# Patient Record
Sex: MALE | Race: WHITE | NOT HISPANIC OR LATINO | Employment: OTHER | URBAN - NONMETROPOLITAN AREA
[De-identification: names, ages, dates, MRNs, and addresses within clinical notes are randomized per-mention and may not be internally consistent; named-entity substitution may affect disease eponyms.]

---

## 2022-03-12 ENCOUNTER — HOSPITAL ENCOUNTER (EMERGENCY)
Facility: HOSPITAL | Age: 57
Discharge: HOME | End: 2022-03-12
Attending: STUDENT IN AN ORGANIZED HEALTH CARE EDUCATION/TRAINING PROGRAM
Payer: COMMERCIAL

## 2022-03-12 VITALS
TEMPERATURE: 98 F | WEIGHT: 175 LBS | RESPIRATION RATE: 28 BRPM | OXYGEN SATURATION: 98 % | DIASTOLIC BLOOD PRESSURE: 93 MMHG | SYSTOLIC BLOOD PRESSURE: 143 MMHG | HEART RATE: 93 BPM

## 2022-03-12 DIAGNOSIS — R00.0 TACHYCARDIA: Primary | ICD-10-CM

## 2022-03-12 PROCEDURE — 99283 EMERGENCY DEPT VISIT LOW MDM: CPT | Mod: 25

## 2022-03-12 PROCEDURE — 93010 EKG 12-LEAD: ICD-10-PCS | Mod: ,,, | Performed by: INTERNAL MEDICINE

## 2022-03-12 PROCEDURE — 93005 ELECTROCARDIOGRAM TRACING: CPT

## 2022-03-12 PROCEDURE — 93010 ELECTROCARDIOGRAM REPORT: CPT | Mod: ,,, | Performed by: INTERNAL MEDICINE

## 2022-03-12 NOTE — ED PROVIDER NOTES
Encounter Date: 3/12/2022       History     Chief Complaint   Patient presents with    Tachycardia     Pt stated that he has been experiencing tachycardia. HTN meds have been adjusted recently as well as adding a beta blocker. Pt stated that he stopped taking beta blocker on his own due to HR being in 50s. Presents to ED with complaints of fatigue.      56-year-old male with history of hypertension and currently having medication adjusted presents for episodes of heart rate going above 100 to 110. Patient says that episode has been going on for 3 weeks and has been getting medication adjusted with PCP including beta-blocker.  Patient said that he has been unable to take the beta-blocker because it makes him fatigue.  The patient otherwise denies any chest pain, vomiting, diarrhea, shortness of breath, black tarry stool, lightheadedness        Review of patient's allergies indicates:  No Known Allergies  Past Medical History:   Diagnosis Date    Hypertension      No past surgical history on file.  No family history on file.     Review of Systems   Constitutional: Negative.    HENT: Negative.    Respiratory: Negative.    Cardiovascular: Positive for palpitations.   Gastrointestinal: Negative.    Genitourinary: Negative.    Musculoskeletal: Negative.    Skin: Negative.    Neurological: Negative.    Psychiatric/Behavioral: Negative.    All other systems reviewed and are negative.      Physical Exam     Initial Vitals [03/12/22 1355]   BP Pulse Resp Temp SpO2   (!) 162/97 110 18 97.9 °F (36.6 °C) 99 %      MAP       --         Physical Exam    Nursing note and vitals reviewed.  Constitutional: Vital signs are normal. He appears well-developed and well-nourished.   HENT:   Head: Normocephalic and atraumatic.   Eyes: Conjunctivae and lids are normal.   Neck: Trachea normal. Neck supple.   Cardiovascular: Normal rate, regular rhythm, normal heart sounds, intact distal pulses and normal pulses. Exam reveals no gallop.     No murmur heard.  Pulmonary/Chest: Breath sounds normal. He has no wheezes. He has no rhonchi.   Abdominal: Abdomen is soft. Bowel sounds are normal. He exhibits no distension. There is no abdominal tenderness. There is no rebound and no guarding.   Musculoskeletal:         General: Normal range of motion.      Cervical back: Neck supple.     Neurological: He is alert and oriented to person, place, and time. He has normal strength. GCS eye subscore is 4. GCS verbal subscore is 5. GCS motor subscore is 6.   Skin: Skin is warm. Capillary refill takes less than 2 seconds.   Psychiatric: He has a normal mood and affect. His speech is normal. Thought content normal.         ED Course   Procedures  Labs Reviewed - No data to display  EKG Readings: (Independently Interpreted)   Initial Reading: No STEMI. Rhythm: Normal Sinus Rhythm. Ectopy: No Ectopy. Axis: Normal.       Imaging Results    None          Medications - No data to display  Medical Decision Making:   Initial Assessment:   56-year-old male with history of hypertension and currently having medication adjusted presents for episodes of heart rate going above 100 to 110. Patient tachycardic and mildly hypertensive.  Vitals improved when more calm.  Patient has had episodes for over 3 weeks.  Advised patient that he needs to avoid caffeine or stimulants.  Told patient that he needs to follow up with Cardiology.  Already being followed by pcp  Clinical Tests:   Medical Tests: Ordered and Reviewed                      Clinical Impression:   Final diagnoses:  [R00.0] Tachycardia (Primary)          ED Disposition Condition    Discharge Stable        ED Prescriptions     None        Follow-up Information     Follow up With Specialties Details Why Contact Info    Jonny Merino MD Cardiology   [address] [address]  [phone]             Bertin Cortez MD  03/12/22 3850

## 2022-03-12 NOTE — Clinical Note
"Kenneth Davila" Michele was seen and treated in our emergency department on 3/12/2022.  He may return to work on 03/14/2022.       If you have any questions or concerns, please don't hesitate to call.      Bertin Cortez MD"